# Patient Record
Sex: FEMALE | Race: WHITE | NOT HISPANIC OR LATINO | Employment: FULL TIME | URBAN - METROPOLITAN AREA
[De-identification: names, ages, dates, MRNs, and addresses within clinical notes are randomized per-mention and may not be internally consistent; named-entity substitution may affect disease eponyms.]

---

## 2022-03-22 ENCOUNTER — ANCILLARY PROCEDURE (OUTPATIENT)
Dept: GENERAL RADIOLOGY | Facility: CLINIC | Age: 32
End: 2022-03-22
Attending: PHYSICIAN ASSISTANT
Payer: COMMERCIAL

## 2022-03-22 ENCOUNTER — OFFICE VISIT (OUTPATIENT)
Dept: URGENT CARE | Facility: URGENT CARE | Age: 32
End: 2022-03-22
Payer: COMMERCIAL

## 2022-03-22 VITALS
WEIGHT: 170 LBS | RESPIRATION RATE: 20 BRPM | OXYGEN SATURATION: 99 % | DIASTOLIC BLOOD PRESSURE: 76 MMHG | TEMPERATURE: 98.2 F | SYSTOLIC BLOOD PRESSURE: 122 MMHG | HEART RATE: 91 BPM

## 2022-03-22 DIAGNOSIS — M25.572 PAIN IN JOINT, ANKLE AND FOOT, LEFT: Primary | ICD-10-CM

## 2022-03-22 DIAGNOSIS — M25.572 PAIN IN JOINT, ANKLE AND FOOT, LEFT: ICD-10-CM

## 2022-03-22 PROCEDURE — 73610 X-RAY EXAM OF ANKLE: CPT | Mod: LT | Performed by: RADIOLOGY

## 2022-03-22 PROCEDURE — 73630 X-RAY EXAM OF FOOT: CPT | Mod: LT | Performed by: RADIOLOGY

## 2022-03-22 PROCEDURE — 99203 OFFICE O/P NEW LOW 30 MIN: CPT | Performed by: PHYSICIAN ASSISTANT

## 2022-03-22 RX ORDER — NAPROXEN 500 MG/1
500 TABLET ORAL 2 TIMES DAILY WITH MEALS
Qty: 30 TABLET | Refills: 0 | Status: SHIPPED | OUTPATIENT
Start: 2022-03-22

## 2022-03-22 RX ORDER — HYDROCODONE BITARTRATE AND ACETAMINOPHEN 5; 325 MG/1; MG/1
1 TABLET ORAL EVERY 6 HOURS PRN
Qty: 10 TABLET | Refills: 0 | Status: SHIPPED | OUTPATIENT
Start: 2022-03-22 | End: 2022-03-25

## 2022-03-22 NOTE — PROGRESS NOTES
Assessment & Plan     Pain in joint, ankle and foot, left  Reassurance, no acute fracture. Fitted with walking boot to wear as needed for comfort and crutches. Can take naproxen two times daily x 5-10 days for pain and inflammation. Also prescribed norco #10 with no refills for pain at night. Avoid aggravating factors but encouraged gentle stretching/ROM. Follow up with ortho if needed.        - XR Ankle Left G/E 3 Views; Future  - XR Foot Left G/E 3 Views; Future  - Ankle/Foot Bracing Supplies Order for DME - ONLY FOR DME  - Crutches Order for DME - ONLY FOR DME  - naproxen (NAPROSYN) 500 MG tablet; Take 1 tablet (500 mg) by mouth 2 times daily (with meals)  - HYDROcodone-acetaminophen (NORCO) 5-325 MG tablet; Take 1 tablet by mouth every 6 hours as needed for severe pain                 No follow-ups on file.    Joyce Hammond PA-C  [facility] [place]            Subjective   Chief Complaint   Patient presents with     Musculoskeletal Problem     left ankle pain after falling down 3 steps on 3/21/2022.          HPI     MS Injury/Pain    Onset of symptoms was 1 day(s) ago.  Location: left ankle  Context:       The injury happened while at home      Mechanism: fall       Patient experienced immediate pain  Course of symptoms is same.    Severity moderate  Current and Associated symptoms: Pain  Aggravating Factors: walking, weight-bearing, movement and flexion/extension  Therapies to improve symptoms include: ibuprofen  This is the first time this type of problem has occurred for this patient.             Review of Systems   Constitutional, HEENT, cardiovascular, pulmonary, gi and gu systems are negative, except as otherwise noted.      Objective    /76   Pulse 91   Temp 98.2  F (36.8  C) (Tympanic)   Resp 20   Wt 77.1 kg (170 lb)   SpO2 99%   Breastfeeding No   There is no height or weight on file to calculate BMI.  Physical Exam  Constitutional:       General: She is not in  acute distress.  Musculoskeletal:      Comments: No obvious deformity, erythema, or ecchymosis of the left ankle. There is moderate swelling and tenderness with palpation around the medial and lateral malleolus as well as the top of foot. Decreased ROM due to pain. Lower extremity CMS intact.    Neurological:      Mental Status: She is alert.

## 2022-03-22 NOTE — LETTER
[facility] [place]  [address] [address]  Phone: [phone]  Fax: [phone]    March 22, 2022        Sirisha Jordan  [address] [address] [facility] [address]          To whom it may concern:    RE: Sirisha Jordan    Patient was seen and treated today at our clinic and missed work 3/22/22 through 3/25/22    Please contact me for questions or concerns.      Sincerely,        Joyce Hammond PA-C

## 2022-04-03 ENCOUNTER — HEALTH MAINTENANCE LETTER (OUTPATIENT)
Age: 32
End: 2022-04-03

## 2022-10-03 ENCOUNTER — HEALTH MAINTENANCE LETTER (OUTPATIENT)
Age: 32
End: 2022-10-03

## 2023-04-30 ENCOUNTER — HEALTH MAINTENANCE LETTER (OUTPATIENT)
Age: 33
End: 2023-04-30

## 2024-07-07 ENCOUNTER — HEALTH MAINTENANCE LETTER (OUTPATIENT)
Age: 34
End: 2024-07-07